# Patient Record
Sex: MALE | Race: WHITE | NOT HISPANIC OR LATINO | URBAN - NONMETROPOLITAN AREA
[De-identification: names, ages, dates, MRNs, and addresses within clinical notes are randomized per-mention and may not be internally consistent; named-entity substitution may affect disease eponyms.]

---

## 2019-07-23 NOTE — PROCEDURE NOTE: SURGICAL
<p>Prior to commencing surgery patient identification, surgical procedure, site, and side were confirmed by Dr. Rehman.&nbsp; Following topical proparacaine anesthesia, the patient was positioned at the YAG laser, a contact lens coupled to the cornea of the right eye with methylcellulose and an axial posterior capsulotomy performed without complication using 3.3 Mj x 25. Attention was then turned to the left eye and a contact lens coupled to the cornea of the left eye with methylcellulose and an axial posterior capsulotomy performed without complication using 3.3 Mj x 22. One drop of Alphagan was instilled in both eyes and the patient returned to the holding area having tolerated the procedure well and without complication. </p><p>MRN:[id number]</p>

## 2022-04-20 ENCOUNTER — CONSULTATION/EVALUATION (OUTPATIENT)
Dept: URBAN - NONMETROPOLITAN AREA CLINIC 1 | Facility: CLINIC | Age: 77
End: 2022-04-20

## 2022-04-20 DIAGNOSIS — H25.13: ICD-10-CM

## 2022-04-20 PROCEDURE — 99214 OFFICE O/P EST MOD 30 MIN: CPT

## 2022-04-20 ASSESSMENT — TONOMETRY
OD_IOP_MMHG: 18
OS_IOP_MMHG: 17

## 2022-04-20 ASSESSMENT — VISUAL ACUITY
OS_SC: 20/200
OD_BAT: 20/50
OD_PAM: 20/20
OD_CC: 20/25
OS_AM: 20/20
OD_CC: 20/40
OS_CC: 20/30+1
OD_PH: 20/30-1
OS_CC: 20/25-2
OS_BAT: 20/50
OD_SC: 20/50

## 2022-04-20 NOTE — PATIENT DISCUSSION
(Surgical) Visually Significant (secondary to glare), discussed the risks, benefits, alternatives, and limitations of cataract surgery. The patient stated a full understanding and a desire to proceed with the procedure. The patient will need to return for pre-op appointment with cataract measurements and to have any additional questions answered, and start pre-operative eye drops as directed. Based off today's testing, patient qualifies for Vivity Toric OU, discussed lens benefits & VA expectations @ distance, near, & intermediate. Advised pt need for mild otc readers for certain activities & reading distances etc. Recc start AT & Lotemax TID OU x1 week & RTC for Repeat ese OU. Pt elects LenSx OU. Pt elects to proceed with OS first and OD after.

## 2022-06-15 ENCOUNTER — PRE-OP/H&P (OUTPATIENT)
Dept: URBAN - NONMETROPOLITAN AREA CLINIC 1 | Facility: CLINIC | Age: 77
End: 2022-06-15

## 2022-06-15 VITALS
BODY MASS INDEX: 28.62 KG/M2 | WEIGHT: 223 LBS | SYSTOLIC BLOOD PRESSURE: 146 MMHG | HEART RATE: 66 BPM | DIASTOLIC BLOOD PRESSURE: 80 MMHG | HEIGHT: 74 IN

## 2022-06-15 DIAGNOSIS — Z01.818: ICD-10-CM

## 2022-06-15 PROCEDURE — 92134 CPTRZ OPH DX IMG PST SGM RTA: CPT

## 2022-06-15 PROCEDURE — 99499 UNLISTED E&M SERVICE: CPT

## 2022-06-15 ASSESSMENT — VISUAL ACUITY
OS_BAT: 20/50
OD_BAT: 20/50
OD_CC: 20/40
OS_AM: 20/20
OD_PAM: 20/20
OD_SC: 20/50
OS_CC: 20/25-2
OS_SC: 20/200
OS_CC: 20/30+1
OD_CC: 20/25
OD_PH: 20/30

## 2022-10-19 ENCOUNTER — CONSULTATION/EVALUATION (OUTPATIENT)
Dept: URBAN - NONMETROPOLITAN AREA CLINIC 1 | Facility: CLINIC | Age: 77
End: 2022-10-19

## 2022-10-19 DIAGNOSIS — H25.813: ICD-10-CM

## 2022-10-19 PROCEDURE — 99214 OFFICE O/P EST MOD 30 MIN: CPT

## 2022-10-19 ASSESSMENT — VISUAL ACUITY
OD_SC: 20/50
OS_BAT: 20/100
OS_SC: 20/200
OS_CC: 20/25-2
OD_CC: 20/40
OD_BAT: 20/70
OS_AM: 20/20
OD_PH: 20/30
OD_PAM: 20/20
OS_CC: 20/30+
OD_CC: 20/25

## 2022-10-19 ASSESSMENT — TONOMETRY
OS_IOP_MMHG: 17
OD_IOP_MMHG: 16

## 2022-10-19 NOTE — PATIENT DISCUSSION
Visually significant cataract. Cataract(s) causing symptomatic impairment of visual function not correctable with a tolerable change in glasses or contact lenses, lighting, or non-operative means resulting in specific activity limitations and/or participation restrictions including, but not limited to reading, viewing television, driving, or meeting vocational or recreational needs. Expectation is clearer vision and functional improvement in symptoms as well as reduced glare disability after cataract removal. Order IOL Master and OPD today. Recommend Toric IOL / Limbal Relaxing Incisions / Multifocal, based on today's OPD testing and lifestyle questionnaire. All questions were answered regarding surgery, including pre- and post-op medications, appointments, activity restrictions and anesthetic usage. The risks, benefits and alternatives, and special risk factors for the patient, were discussed in detail, including but not limited to bleeding, infection, retinal detachment, vitreous loss, problems with the implant, and possible need for additional surgery. Although rare, the possibility of complete vision loss was discussed. The possible need for glasses post-operatively was discussed. Order medical clearance exam based on history of hypertension . Patient elects to proceed with cataract surgery OS . Will schedule at patient's convenience and re-evaluate OD  in the future.

## 2022-10-19 NOTE — PATIENT DISCUSSION
Vivity: The patient understands the Vivity lens is an extended depth of focus lens and corrects distance/intermediate visual acuity. The need for OTC reading glasses for some activities including fine print was discussed. The patient elects to proceed with [address] OU OU.

## 2022-10-27 ENCOUNTER — PRE-OP/H&P (OUTPATIENT)
Dept: URBAN - NONMETROPOLITAN AREA CLINIC 1 | Facility: CLINIC | Age: 77
End: 2022-10-27

## 2022-10-27 VITALS
BODY MASS INDEX: 30.48 KG/M2 | SYSTOLIC BLOOD PRESSURE: 140 MMHG | DIASTOLIC BLOOD PRESSURE: 86 MMHG | HEIGHT: 73 IN | WEIGHT: 230 LBS | HEART RATE: 76 BPM

## 2022-10-27 DIAGNOSIS — Z01.818: ICD-10-CM

## 2022-10-27 PROCEDURE — 99213 OFFICE O/P EST LOW 20 MIN: CPT

## 2022-10-27 ASSESSMENT — VISUAL ACUITY
OD_PH: 20/30
OS_CC: 20/25-2
OD_BAT: 20/70
OD_SC: 20/50
OS_SC: 20/200
OS_AM: 20/20
OD_CC: 20/25
OD_PAM: 20/20
OD_CC: 20/40
OS_CC: 20/30+1
OS_BAT: 20/100

## 2022-11-01 ENCOUNTER — SURGERY/PROCEDURE (OUTPATIENT)
Dept: URBAN - NONMETROPOLITAN AREA CLINIC 2 | Facility: CLINIC | Age: 77
End: 2022-11-01

## 2022-11-01 DIAGNOSIS — H25.12: ICD-10-CM

## 2022-11-01 PROCEDURE — 66984 XCAPSL CTRC RMVL W/O ECP: CPT

## 2022-11-01 PROCEDURE — V2788LX VIVITY LENSX

## 2022-11-01 PROCEDURE — 68841 INSJ RX ELUT IMPLT LAC CANAL: CPT

## 2022-11-02 ENCOUNTER — POST OP/EVAL OF SECOND EYE (OUTPATIENT)
Dept: URBAN - NONMETROPOLITAN AREA CLINIC 1 | Facility: CLINIC | Age: 77
End: 2022-11-02

## 2022-11-02 DIAGNOSIS — H25.813: ICD-10-CM

## 2022-11-02 PROCEDURE — 99213 OFFICE O/P EST LOW 20 MIN: CPT

## 2022-11-02 ASSESSMENT — VISUAL ACUITY
OD_CC: 20/40
OD_SC: 20/50
OS_SC: 20/70
OD_BAT: 20/70
OD_PH: 20/30
OD_PAM: 20/20
OD_CC: 20/25

## 2022-11-02 ASSESSMENT — TONOMETRY
OD_IOP_MMHG: 17
OS_IOP_MMHG: 20

## 2022-11-02 NOTE — PATIENT DISCUSSION
(Surgical) Visually Significant (secondary to glare), discussed the risks, benefits, alternatives, and limitations of cataract surgery. The patient stated a full understanding and a desire to proceed with the procedure. The patient will need to return for pre-op appointment with cataract measurements and to have any additional questions answered, and start pre-operative eye drops as directed.  Patient okay to proceed with Dr. Noé Desouza.

## 2022-11-02 NOTE — PATIENT DISCUSSION
Discussed diagnosis in detail with patient. Wound intact. Continue post op drops as directed. Continue to monitor.

## 2022-11-08 ENCOUNTER — SURGERY/PROCEDURE (OUTPATIENT)
Dept: URBAN - NONMETROPOLITAN AREA CLINIC 2 | Facility: CLINIC | Age: 77
End: 2022-11-08

## 2022-11-08 DIAGNOSIS — H25.811: ICD-10-CM

## 2022-11-08 PROCEDURE — 68841 INSJ RX ELUT IMPLT LAC CANAL: CPT

## 2022-11-08 PROCEDURE — 66984 XCAPSL CTRC RMVL W/O ECP: CPT

## 2022-11-09 ENCOUNTER — POST-OP (OUTPATIENT)
Dept: URBAN - NONMETROPOLITAN AREA CLINIC 1 | Facility: CLINIC | Age: 77
End: 2022-11-09

## 2022-11-09 DIAGNOSIS — Z98.41: ICD-10-CM

## 2022-11-09 DIAGNOSIS — Z98.42: ICD-10-CM

## 2022-11-09 PROCEDURE — 99024 POSTOP FOLLOW-UP VISIT: CPT

## 2022-11-09 ASSESSMENT — VISUAL ACUITY
OD_SC: 20/22
OS_SC: 20/22

## 2022-11-09 ASSESSMENT — TONOMETRY
OS_IOP_MMHG: 18
OD_IOP_MMHG: 17

## 2022-11-15 ENCOUNTER — POST-OP (OUTPATIENT)
Dept: URBAN - NONMETROPOLITAN AREA CLINIC 1 | Facility: CLINIC | Age: 77
End: 2022-11-15

## 2022-11-15 DIAGNOSIS — Z98.42: ICD-10-CM

## 2022-11-15 DIAGNOSIS — Z98.41: ICD-10-CM

## 2022-11-15 PROCEDURE — 99024 POSTOP FOLLOW-UP VISIT: CPT

## 2022-11-15 ASSESSMENT — VISUAL ACUITY
OU_SC: 20/50
OU_SC: 20/25+1

## 2022-11-15 ASSESSMENT — TONOMETRY
OS_IOP_MMHG: 16
OD_IOP_MMHG: 16

## 2023-10-23 NOTE — PATIENT DISCUSSION
Discussed condition and exacerbating conditions/situations (e.g., dry/arid environments, overhead fans, air conditioners, side effect of medications). Unrestricted diet/activity Unrestricted diet/activity/Recommended medical follow-up following discharge...

## 2023-12-18 ENCOUNTER — FOLLOW UP (OUTPATIENT)
Dept: URBAN - NONMETROPOLITAN AREA CLINIC 1 | Facility: CLINIC | Age: 78
End: 2023-12-18

## 2023-12-18 DIAGNOSIS — H01.021: ICD-10-CM

## 2023-12-18 DIAGNOSIS — Z96.1: ICD-10-CM

## 2023-12-18 DIAGNOSIS — H26.493: ICD-10-CM

## 2023-12-18 DIAGNOSIS — H01.024: ICD-10-CM

## 2023-12-18 PROCEDURE — 99214 OFFICE O/P EST MOD 30 MIN: CPT

## 2023-12-18 ASSESSMENT — VISUAL ACUITY
OU_SC: 20/20-1
OD_SC: 20/25-2
OS_SC: 20/25-2

## 2023-12-18 ASSESSMENT — TONOMETRY
OS_IOP_MMHG: 12
OD_IOP_MMHG: 12

## 2024-12-19 ENCOUNTER — COMPREHENSIVE EXAM (OUTPATIENT)
Age: 79
End: 2024-12-19

## 2024-12-19 DIAGNOSIS — H01.02B: ICD-10-CM

## 2024-12-19 DIAGNOSIS — Z96.1: ICD-10-CM

## 2024-12-19 DIAGNOSIS — H01.02A: ICD-10-CM

## 2024-12-19 DIAGNOSIS — H26.493: ICD-10-CM

## 2024-12-19 PROCEDURE — 99213 OFFICE O/P EST LOW 20 MIN: CPT
